# Patient Record
Sex: FEMALE | Race: WHITE | NOT HISPANIC OR LATINO | Employment: OTHER | URBAN - METROPOLITAN AREA
[De-identification: names, ages, dates, MRNs, and addresses within clinical notes are randomized per-mention and may not be internally consistent; named-entity substitution may affect disease eponyms.]

---

## 2023-10-27 ENCOUNTER — LAB (OUTPATIENT)
Dept: LAB | Facility: LAB | Age: 78
End: 2023-10-27
Payer: MEDICARE

## 2023-10-27 ENCOUNTER — HOSPITAL ENCOUNTER (OUTPATIENT)
Dept: RADIOLOGY | Facility: HOSPITAL | Age: 78
Discharge: HOME | End: 2023-10-27
Payer: MEDICARE

## 2023-10-27 DIAGNOSIS — E78.2 MIXED HYPERLIPIDEMIA: ICD-10-CM

## 2023-10-27 DIAGNOSIS — Z12.31 ENCOUNTER FOR SCREENING MAMMOGRAM FOR MALIGNANT NEOPLASM OF BREAST: ICD-10-CM

## 2023-10-27 DIAGNOSIS — I10 ESSENTIAL (PRIMARY) HYPERTENSION: ICD-10-CM

## 2023-10-27 DIAGNOSIS — Z00.00 ENCOUNTER FOR GENERAL ADULT MEDICAL EXAMINATION WITHOUT ABNORMAL FINDINGS: Primary | ICD-10-CM

## 2023-10-27 DIAGNOSIS — Z00.00 ENCOUNTER FOR GENERAL ADULT MEDICAL EXAMINATION WITHOUT ABNORMAL FINDINGS: ICD-10-CM

## 2023-10-27 LAB
ANION GAP SERPL CALC-SCNC: 14 MMOL/L (ref 10–20)
BASOPHILS # BLD AUTO: 0.02 X10*3/UL (ref 0–0.1)
BASOPHILS NFR BLD AUTO: 0.3 %
BUN SERPL-MCNC: 11 MG/DL (ref 6–23)
CALCIUM SERPL-MCNC: 9.6 MG/DL (ref 8.6–10.3)
CHLORIDE SERPL-SCNC: 107 MMOL/L (ref 98–107)
CHOLEST SERPL-MCNC: 177 MG/DL (ref 0–199)
CHOLESTEROL/HDL RATIO: 4
CO2 SERPL-SCNC: 25 MMOL/L (ref 21–32)
CREAT SERPL-MCNC: 0.75 MG/DL (ref 0.5–1.05)
EOSINOPHIL # BLD AUTO: 0.13 X10*3/UL (ref 0–0.4)
EOSINOPHIL NFR BLD AUTO: 2 %
ERYTHROCYTE [DISTWIDTH] IN BLOOD BY AUTOMATED COUNT: 12.6 % (ref 11.5–14.5)
GFR SERPL CREATININE-BSD FRML MDRD: 83 ML/MIN/1.73M*2
GLUCOSE SERPL-MCNC: 90 MG/DL (ref 74–99)
HCT VFR BLD AUTO: 45.4 % (ref 36–46)
HDLC SERPL-MCNC: 44.1 MG/DL
HGB BLD-MCNC: 14.5 G/DL (ref 12–16)
IMM GRANULOCYTES # BLD AUTO: 0.02 X10*3/UL (ref 0–0.5)
IMM GRANULOCYTES NFR BLD AUTO: 0.3 % (ref 0–0.9)
LDLC SERPL CALC-MCNC: 86 MG/DL
LYMPHOCYTES # BLD AUTO: 1.64 X10*3/UL (ref 0.8–3)
LYMPHOCYTES NFR BLD AUTO: 25.3 %
MCH RBC QN AUTO: 28.9 PG (ref 26–34)
MCHC RBC AUTO-ENTMCNC: 31.9 G/DL (ref 32–36)
MCV RBC AUTO: 91 FL (ref 80–100)
MONOCYTES # BLD AUTO: 0.42 X10*3/UL (ref 0.05–0.8)
MONOCYTES NFR BLD AUTO: 6.5 %
NEUTROPHILS # BLD AUTO: 4.25 X10*3/UL (ref 1.6–5.5)
NEUTROPHILS NFR BLD AUTO: 65.6 %
NON HDL CHOLESTEROL: 133 MG/DL (ref 0–149)
NRBC BLD-RTO: 0 /100 WBCS (ref 0–0)
PLATELET # BLD AUTO: 222 X10*3/UL (ref 150–450)
PMV BLD AUTO: 10 FL (ref 7.5–11.5)
POTASSIUM SERPL-SCNC: 4.1 MMOL/L (ref 3.5–5.3)
RBC # BLD AUTO: 5.01 X10*6/UL (ref 4–5.2)
SODIUM SERPL-SCNC: 142 MMOL/L (ref 136–145)
TRIGL SERPL-MCNC: 233 MG/DL (ref 0–149)
VLDL: 47 MG/DL (ref 0–40)
WBC # BLD AUTO: 6.5 X10*3/UL (ref 4.4–11.3)

## 2023-10-27 PROCEDURE — 80061 LIPID PANEL: CPT

## 2023-10-27 PROCEDURE — 36415 COLL VENOUS BLD VENIPUNCTURE: CPT

## 2023-10-27 PROCEDURE — 77067 SCR MAMMO BI INCL CAD: CPT

## 2023-10-27 PROCEDURE — 77063 BREAST TOMOSYNTHESIS BI: CPT | Performed by: RADIOLOGY

## 2023-10-27 PROCEDURE — 77063 BREAST TOMOSYNTHESIS BI: CPT

## 2023-10-27 PROCEDURE — 85025 COMPLETE CBC W/AUTO DIFF WBC: CPT

## 2023-10-27 PROCEDURE — 77067 SCR MAMMO BI INCL CAD: CPT | Performed by: RADIOLOGY

## 2023-10-27 PROCEDURE — 80048 BASIC METABOLIC PNL TOTAL CA: CPT

## 2023-11-21 ENCOUNTER — HOSPITAL ENCOUNTER (OUTPATIENT)
Dept: RADIOLOGY | Facility: HOSPITAL | Age: 78
Discharge: HOME | End: 2023-11-21
Payer: MEDICARE

## 2023-11-21 DIAGNOSIS — R92.8 OTHER ABNORMAL AND INCONCLUSIVE FINDINGS ON DIAGNOSTIC IMAGING OF BREAST: ICD-10-CM

## 2023-11-21 PROCEDURE — 76642 ULTRASOUND BREAST LIMITED: CPT | Mod: RIGHT SIDE | Performed by: RADIOLOGY

## 2023-11-21 PROCEDURE — 77065 DX MAMMO INCL CAD UNI: CPT | Mod: RIGHT SIDE | Performed by: RADIOLOGY

## 2023-11-21 PROCEDURE — 76642 ULTRASOUND BREAST LIMITED: CPT | Mod: RT

## 2023-11-21 PROCEDURE — G0279 TOMOSYNTHESIS, MAMMO: HCPCS | Mod: RIGHT SIDE | Performed by: RADIOLOGY

## 2023-11-21 PROCEDURE — 76982 USE 1ST TARGET LESION: CPT | Mod: RIGHT SIDE | Performed by: RADIOLOGY

## 2023-11-21 PROCEDURE — 77061 BREAST TOMOSYNTHESIS UNI: CPT | Mod: RT

## 2024-06-19 ENCOUNTER — APPOINTMENT (OUTPATIENT)
Dept: PHYSICAL THERAPY | Facility: HOSPITAL | Age: 79
End: 2024-06-19
Payer: MEDICARE

## 2024-06-27 ENCOUNTER — EVALUATION (OUTPATIENT)
Dept: PHYSICAL THERAPY | Facility: HOSPITAL | Age: 79
End: 2024-06-27
Payer: MEDICARE

## 2024-06-27 DIAGNOSIS — M54.42 LUMBAGO WITH SCIATICA, LEFT SIDE: ICD-10-CM

## 2024-06-27 DIAGNOSIS — R26.9 GAIT ABNORMALITY: Primary | ICD-10-CM

## 2024-06-27 DIAGNOSIS — G89.29 CHRONIC MIDLINE LOW BACK PAIN WITH LEFT-SIDED SCIATICA: ICD-10-CM

## 2024-06-27 DIAGNOSIS — M54.42 CHRONIC MIDLINE LOW BACK PAIN WITH LEFT-SIDED SCIATICA: ICD-10-CM

## 2024-06-27 PROCEDURE — 97110 THERAPEUTIC EXERCISES: CPT | Mod: GP | Performed by: PHYSICAL THERAPIST

## 2024-06-27 PROCEDURE — 97161 PT EVAL LOW COMPLEX 20 MIN: CPT | Mod: GP | Performed by: PHYSICAL THERAPIST

## 2024-06-27 ASSESSMENT — ENCOUNTER SYMPTOMS
DEPRESSION: 0
LOSS OF SENSATION IN FEET: 0

## 2024-06-27 ASSESSMENT — PATIENT HEALTH QUESTIONNAIRE - PHQ9
2. FEELING DOWN, DEPRESSED OR HOPELESS: NOT AT ALL
1. LITTLE INTEREST OR PLEASURE IN DOING THINGS: NOT AT ALL
SUM OF ALL RESPONSES TO PHQ9 QUESTIONS 1 AND 2: 0

## 2024-06-27 ASSESSMENT — PAIN - FUNCTIONAL ASSESSMENT: PAIN_FUNCTIONAL_ASSESSMENT: 0-10

## 2024-06-27 NOTE — LETTER
June 27, 2024    Bigg Truong MD  [address] [place] [address]    Patient: Teetee Moura   YOB: 1945   Date of Visit: 6/27/2024       Dear Bigg Truong MD  [address] [place] [address]    The attached plan of care is being sent to you because your patient’s medical reimbursement requires that you certify the plan of care. Your signature is required to allow uninterrupted insurance coverage.      You may indicate your approval by signing below and faxing this form back to us at Dept Fax: [phone].    Please call Dept: [phone] with any questions or concerns.    Thank you for this referral,        Solitario Garza, PT  [address] [address] [phone]    Payer: Payor: [employer] / Plan: [facility] MEDICARE ADVANTAGE / Product Type: *No Product type* /                                                                         Date:     Dear Solitario Garza, PT,     Re: Ms. Teetee Moura, MRN:[id number]    I certify that I have reviewed the attached plan of care and it is medically necessary for Ms. Teteee Moura (1945) who is under my care.          ______________________________________                    _________________  Provider name and credentials                                           Date and time                                                                                           Plan of Care 6/27/24   Effective from: 6/27/2024  Effective to: 9/25/2024    Plan ID: [id number]            Participants as of Finalize on 6/27/2024    Name Type Comments Contact Info    Bigg Truong MD PCP - General  [phone]    Solitario Garza, PT Physical Therapist  [phone]       Last Plan Note     Author: Solitario Garza PT Status: Incomplete Last edited: 6/27/2024  3:00 PM       Physical Therapy    Physical Therapy Evaluation and Treatment      Patient Name: Teetee HUDSON  Martell  MRN: [id number]  : 1945   Today's Date: 2024  Time Calculation  Start Time: 1500  Stop Time: 1545  Time Calculation (min): 45 min     PT Evaluation Time Entry  PT Evaluation (Low) Time Entry: 35  PT Therapeutic Procedures Time Entry  Therapeutic Exercise Time Entry: 10      Visit # 1    Assessment:   PT Assessment Results: Decreased strength, Decreased range of motion, Impaired balance, Pain  Rehab Prognosis: Good    Plan:  Treatment/Interventions: Education/ Instruction, Manual therapy, Therapeutic exercises, Cryotherapy, Hot pack    Current Problem:   1. Gait abnormality  Follow Up In Physical Therapy      2. Lumbago with sciatica, left side  Referral to Physical Therapy    Follow Up In Physical Therapy      3. Chronic midline low back pain with left-sided sciatica [M54.42, G89.29]  Follow Up In Physical Therapy          Subjective     Chief complaint: Pt c/o left leg/foot tremors that started a few years ago with Covid. States she gets LBP with walking 5-6 minutes.  who is present states she c/o low back and right LE pain to her foot within 10-15 minutes. Walking better with shopping cart but still difficult. No use of assistive device.     Pain Better: Sitting, Tylenol, heat    Pain Worse: weightbearing     Imaging: NA    Prior level of function: independent     Current limitations: standing/walking    Home setup: Lives with spouse in townhouse, bed/bath upstairs (looking to move to a 1-floor place)    Work: Retired    Patient's goal: Walk upright    Precautions:  Precautions  STEADI Fall Risk Score (The score of 4 or more indicates an increased risk of falling): 5  Precautions Comment: Memory    Pain:  Pain Assessment  Pain Assessment: 0-10  0-10 (Numeric) Pain Score:  (Unable)  Pain Type: Chronic pain  Pain Location: Back  Pain Radiating Towards: R>L hip/LE  Pain Frequency: Intermittent    Objective:  Objective  Gait: small step length, wide base    Posture: left  shift/scoliosis    Lumbar ROM  FLEX: -25%  EXT: -75%  SB R: -50%  SB L: -50%    LE strength:  Hip Flex: 4+/5  Knee EXT: 5/5  Knee FLEX: 5/5  DF: 5/5    Balance: Feet together, eyes open with LOB    SLR: (-) price  LE Pull: NT    Thigh thrust: (-) price    TTP: None    Outcome Measures:  Other Measures  Oswestry Disablity Index (DENTON): 50%     Treatments:  EXERCISES Date  Date  Date Date   VISIT# # # # #    REPS REPS REPS REPS   Nu-Step              Parallel bars:       Lateral walk       Marches       Backwards walk       3-way hip kicks                            T-band:       Pull downs       Chops       Mid rows                     Shuttle:        DLP       SLP                     PPT with marching       Bridges       T-band hip ABD/ER                            HEP:Access Code: [email or web address]  URL: [email or web address]  Date: 06/27/2024  Prepared by: Joaquín Garza    Exercises  - Supine Single Knee to Chest  - 2 x daily - 3 sets - 30 seconds hold  - Supine Double Knee to Chest  - 2 x daily - 3 sets - 30 seconds hold  - Supine Posterior Pelvic Tilt  - 2 x daily - 1-2 sets - 10 reps - 5 hold          Goals:  Active       Lumbar stenosis       <=1/10 pain with daily activities for improved walking tolerance.        Start:  06/27/24    Expected End:  09/25/24            Pt will be able to continuously stand and cook for >=20 minutes.       Start:  06/27/24    Expected End:  09/25/24            Pt will hold feet together, eyes closed for 20 seconds without LOB for improved balance and to avoid falls.        Start:  06/27/24    Expected End:  09/25/24            Improve Modified Oswestry score >=20 points for improved mobility.        Start:  06/27/24    Expected End:  09/25/24            Independent HEP for continued gains after PT is complete.        Start:  06/27/24    Expected End:  09/25/24                   Current Participants as of 6/27/2024    Name Type Comments Contact Info    Bigg Truong  MD PCP - General  [phone]    Signature pending    Solitario Garza, PT Physical Therapist  [phone]

## 2024-06-27 NOTE — PROGRESS NOTES
Physical Therapy    Physical Therapy Evaluation and Treatment      Patient Name: Teetee Moura  MRN: [id number]  : 1945   Today's Date: 2024  Time Calculation  Start Time: 1500  Stop Time: 1545  Time Calculation (min): 45 min     PT Evaluation Time Entry  PT Evaluation (Low) Time Entry: 35  PT Therapeutic Procedures Time Entry  Therapeutic Exercise Time Entry: 10      Visit # 1    Assessment:   PT Assessment Results: Decreased strength, Decreased range of motion, Impaired balance, Pain  Rehab Prognosis: Good    Plan:  Treatment/Interventions: Education/ Instruction, Manual therapy, Therapeutic exercises, Cryotherapy, Hot pack    Current Problem:   1. Gait abnormality  Follow Up In Physical Therapy      2. Lumbago with sciatica, left side  Referral to Physical Therapy    Follow Up In Physical Therapy      3. Chronic midline low back pain with left-sided sciatica [M54.42, G89.29]  Follow Up In Physical Therapy          Subjective      Chief complaint: Pt c/o left leg/foot tremors that started a few years ago with Covid. States she gets LBP with walking 5-6 minutes.  who is present states she c/o low back and right LE pain to her foot within 10-15 minutes. Walking better with shopping cart but still difficult. No use of assistive device.     Pain Better: Sitting, Tylenol, heat    Pain Worse: weightbearing     Imaging: NA    Prior level of function: independent     Current limitations: standing/walking    Home setup: Lives with spouse in townhouse, bed/bath upstairs (looking to move to a 1-floor place)    Work: Retired    Patient's goal: Walk upright    Precautions:  Precautions  STEADI Fall Risk Score (The score of 4 or more indicates an increased risk of falling): 5  Precautions Comment: Memory    Pain:  Pain Assessment  Pain Assessment: 0-10  0-10 (Numeric) Pain Score:  (Unable)  Pain Type: Chronic pain  Pain Location: Back  Pain Radiating Towards: R>L hip/LE  Pain Frequency:  Intermittent    Objective:  Objective   Gait: small step length, wide base    Posture: left shift/scoliosis    Lumbar ROM  FLEX: -25%  EXT: -75%  SB R: -50%  SB L: -50%    LE strength:  Hip Flex: 4+/5  Knee EXT: 5/5  Knee FLEX: 5/5  DF: 5/5    Balance: Feet together, eyes open with LOB    SLR: (-) price  LE Pull: NT    Thigh thrust: (-) price    TTP: None    Outcome Measures:  Other Measures  Oswestry Disablity Index (DENTON): 50%     Treatments:  EXERCISES Date  Date  Date Date   VISIT# # # # #    REPS REPS REPS REPS   Nu-Step              Parallel bars:       Lateral walk       Marches       Backwards walk       3-way hip kicks                            T-band:       Pull downs       Chops       Mid rows                     Shuttle:        DLP       SLP                     PPT with marching       Bridges       T-band hip ABD/ER                            HEP:Access Code: [email or web address]  URL: [email or web address]  Date: 06/27/2024  Prepared by: Joaquín Garza    Exercises  - Supine Single Knee to Chest  - 2 x daily - 3 sets - 30 seconds hold  - Supine Double Knee to Chest  - 2 x daily - 3 sets - 30 seconds hold  - Supine Posterior Pelvic Tilt  - 2 x daily - 1-2 sets - 10 reps - 5 hold          Goals:  Active       Lumbar stenosis       <=1/10 pain with daily activities for improved walking tolerance.        Start:  06/27/24    Expected End:  09/25/24            Pt will be able to continuously stand and cook for >=20 minutes.       Start:  06/27/24    Expected End:  09/25/24            Pt will hold feet together, eyes closed for 20 seconds without LOB for improved balance and to avoid falls.        Start:  06/27/24    Expected End:  09/25/24            Improve Modified Oswestry score >=20 points for improved mobility.        Start:  06/27/24    Expected End:  09/25/24            Independent HEP for continued gains after PT is complete.        Start:  06/27/24    Expected End:  09/25/24

## 2024-08-27 ENCOUNTER — DOCUMENTATION (OUTPATIENT)
Dept: PHYSICAL THERAPY | Facility: HOSPITAL | Age: 79
End: 2024-08-27
Payer: MEDICARE

## 2024-08-27 NOTE — PROGRESS NOTES
Physical Therapy    Discharge Summary    Name: Teetee Moura  MRN: [id number]  : 1945  Date: 24    Discharge Information   Date of discharge: 24   Date of last visit: 24   Date of evaluation: 24   Number of attended visits: 1   Referred by: Dr. Truong   Referred for: LBP      Rehab Discharge Reason: Failed to schedule and/or keep follow-up appointment(s)